# Patient Record
Sex: FEMALE | Race: WHITE | NOT HISPANIC OR LATINO | URBAN - METROPOLITAN AREA
[De-identification: names, ages, dates, MRNs, and addresses within clinical notes are randomized per-mention and may not be internally consistent; named-entity substitution may affect disease eponyms.]

---

## 2017-03-16 ENCOUNTER — APPOINTMENT (OUTPATIENT)
Age: 27
Setting detail: DERMATOLOGY
End: 2017-03-17

## 2017-03-16 DIAGNOSIS — D485 NEOPLASM OF UNCERTAIN BEHAVIOR OF SKIN: ICD-10-CM

## 2017-03-16 DIAGNOSIS — L91.0 HYPERTROPHIC SCAR: ICD-10-CM

## 2017-03-16 PROBLEM — D48.5 NEOPLASM OF UNCERTAIN BEHAVIOR OF SKIN: Status: ACTIVE | Noted: 2017-03-16

## 2017-03-16 PROCEDURE — 99213 OFFICE O/P EST LOW 20 MIN: CPT | Mod: 25

## 2017-03-16 PROCEDURE — OTHER RECOMMENDATIONS: OTHER

## 2017-03-16 PROCEDURE — 11900 INJECT SKIN LESIONS </W 7: CPT

## 2017-03-16 PROCEDURE — OTHER INTRALESIONAL KENALOG: OTHER

## 2017-03-16 PROCEDURE — OTHER FOLLOW UP FOR NEXT VISIT: OTHER

## 2017-03-16 PROCEDURE — OTHER COUNSELING: OTHER

## 2017-03-16 ASSESSMENT — LOCATION ZONE DERM
LOCATION ZONE: TRUNK
LOCATION ZONE: ARM

## 2017-03-16 ASSESSMENT — LOCATION DETAILED DESCRIPTION DERM
LOCATION DETAILED: RIGHT ANTERIOR SHOULDER
LOCATION DETAILED: RIGHT POSTERIOR SHOULDER
LOCATION DETAILED: LEFT SUPERIOR LATERAL UPPER BACK
LOCATION DETAILED: LEFT SUPERIOR UPPER BACK

## 2017-03-16 ASSESSMENT — LOCATION SIMPLE DESCRIPTION DERM
LOCATION SIMPLE: LEFT UPPER BACK
LOCATION SIMPLE: RIGHT SHOULDER

## 2017-03-16 NOTE — PROCEDURE: RECOMMENDATIONS
Recommendation Preamble: The following recommendations were made during the visit: make sure to moisturize twice daily. May use an Aquaphor healing ointment to neoplasm on chest twice daily until resolved.
Detail Level: Zone

## 2017-03-16 NOTE — PROCEDURE: FOLLOW UP FOR NEXT VISIT
Scheduled For Follow Up In (Optional): Re-check chest. If not resolved, plan liquid nitrogen treatment, or biopsy.
Detail Level: Simple

## 2017-03-16 NOTE — PROCEDURE: INTRALESIONAL KENALOG
X Size Of Lesion In Cm (Optional): 0
Include Z78.9 (Other Specified Conditions Influencing Health Status) As An Associated Diagnosis?: No
Consent: The risks of atrophy were reviewed with the patient.
Detail Level: Zone
Total Volume Injected (Ccs- Only Use Numbers And Decimals): 1cc
Medical Necessity Clause: This procedure was medically necessary because the lesions that were treated were:
Kenalog Preparation: Kenalog
Concentration Of Solution Injected (Mg/Ml): 5.0

## 2017-09-21 ENCOUNTER — APPOINTMENT (OUTPATIENT)
Age: 27
Setting detail: DERMATOLOGY
End: 2017-09-25

## 2017-09-21 DIAGNOSIS — Z41.9 ENCOUNTER FOR PROCEDURE FOR PURPOSES OTHER THAN REMEDYING HEALTH STATE, UNSPECIFIED: ICD-10-CM

## 2017-09-21 PROCEDURE — OTHER MEDICAL CONSULTATION: COOLSCULPTING: OTHER

## 2017-09-21 ASSESSMENT — LOCATION SIMPLE DESCRIPTION DERM: LOCATION SIMPLE: ABDOMEN

## 2017-09-21 ASSESSMENT — LOCATION ZONE DERM: LOCATION ZONE: TRUNK

## 2017-09-21 ASSESSMENT — LOCATION DETAILED DESCRIPTION DERM
LOCATION DETAILED: RIGHT LATERAL ABDOMEN
LOCATION DETAILED: LEFT LATERAL ABDOMEN

## 2017-10-05 ENCOUNTER — APPOINTMENT (OUTPATIENT)
Age: 27
Setting detail: DERMATOLOGY
End: 2017-10-06

## 2017-10-05 DIAGNOSIS — Z41.9 ENCOUNTER FOR PROCEDURE FOR PURPOSES OTHER THAN REMEDYING HEALTH STATE, UNSPECIFIED: ICD-10-CM

## 2017-10-05 PROCEDURE — OTHER COOLSCULPTING: OTHER

## 2017-10-05 ASSESSMENT — LOCATION SIMPLE DESCRIPTION DERM: LOCATION SIMPLE: ABDOMEN

## 2017-10-05 ASSESSMENT — LOCATION DETAILED DESCRIPTION DERM
LOCATION DETAILED: RIGHT LATERAL ABDOMEN
LOCATION DETAILED: LEFT LATERAL ABDOMEN

## 2017-10-05 ASSESSMENT — LOCATION ZONE DERM: LOCATION ZONE: TRUNK

## 2017-10-05 NOTE — PROCEDURE: COOLSCULPTING
Consent: Written consent obtained, risks reviewed including but not limited to blistering from suction, darker or lighter pigmentary change, bruising, and/or need for multiple treatments.
Suction Settings: The suction settings were per protocol.
Indication: non-invasive fat removal
Location: flank (right)
Location: flank (left)
Applicators: CoolMax
Intro: Prior to treatment, the area was cleaned with alcohol and marked out with a marking pen. The gel sheet was then applied uniformly. The applicator was applied to the skin with good contact and suction.
Post-Care Instructions: I reviewed with the patient in detail post-care instructions. Patient should stay away from the sun and wear sun protection until treated areas are fully healed.
Device Serial Number (Optional): max
Detail Level: Zone
Post Treatment: After treatment, the suction was turned off, and the applicator was removed from the skin.
Time (Minutes): 60
Device: Zeltiq CoolSculpt
Patient Weight-Include Units (Optional): 187